# Patient Record
Sex: MALE | Race: WHITE | URBAN - METROPOLITAN AREA
[De-identification: names, ages, dates, MRNs, and addresses within clinical notes are randomized per-mention and may not be internally consistent; named-entity substitution may affect disease eponyms.]

---

## 2021-11-05 ENCOUNTER — OFFICE VISIT (OUTPATIENT)
Dept: URBAN - METROPOLITAN AREA CLINIC 60 | Facility: CLINIC | Age: 41
End: 2021-11-05
Payer: COMMERCIAL

## 2021-11-05 DIAGNOSIS — H18.469 PERIPHERAL CORNEAL DEGENERATION: Primary | ICD-10-CM

## 2021-11-05 PROCEDURE — 92025 CPTRIZED CORNEAL TOPOGRAPHY: CPT | Performed by: OPTOMETRIST

## 2021-11-05 PROCEDURE — 92004 COMPRE OPH EXAM NEW PT 1/>: CPT | Performed by: OPTOMETRIST

## 2021-11-05 ASSESSMENT — INTRAOCULAR PRESSURE
OD: 16
OS: 15

## 2021-11-05 ASSESSMENT — VISUAL ACUITY: OS: 20/20

## 2021-11-05 ASSESSMENT — KERATOMETRY: OS: 44.12

## 2021-11-05 NOTE — IMPRESSION/PLAN
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 Rafael done today. Will refer to corneal specialist. If cornea specialist is not contracted with patient's insurance, recommend patient seek care with cornea specialist in [place] within our office.